# Patient Record
Sex: FEMALE | Race: WHITE
[De-identification: names, ages, dates, MRNs, and addresses within clinical notes are randomized per-mention and may not be internally consistent; named-entity substitution may affect disease eponyms.]

---

## 2020-12-30 ENCOUNTER — HOSPITAL ENCOUNTER (INPATIENT)
Dept: HOSPITAL 46 - DS | Age: 60
LOS: 2 days | Discharge: HOME | DRG: 743 | End: 2021-01-01
Attending: OBSTETRICS & GYNECOLOGY | Admitting: OBSTETRICS & GYNECOLOGY
Payer: COMMERCIAL

## 2020-12-30 VITALS — WEIGHT: 170.35 LBS | BODY MASS INDEX: 28.38 KG/M2 | HEIGHT: 65 IN

## 2020-12-30 DIAGNOSIS — K66.0: ICD-10-CM

## 2020-12-30 DIAGNOSIS — Z79.899: ICD-10-CM

## 2020-12-30 DIAGNOSIS — G89.18: ICD-10-CM

## 2020-12-30 DIAGNOSIS — K21.9: ICD-10-CM

## 2020-12-30 DIAGNOSIS — N83.8: Primary | ICD-10-CM

## 2020-12-30 DIAGNOSIS — Z90.710: ICD-10-CM

## 2020-12-30 DIAGNOSIS — Z53.31: ICD-10-CM

## 2020-12-30 PROCEDURE — 3E0T33Z INTRODUCTION OF ANTI-INFLAMMATORY INTO PERIPHERAL NERVES AND PLEXI, PERCUTANEOUS APPROACH: ICD-10-PCS | Performed by: NURSE ANESTHETIST, CERTIFIED REGISTERED

## 2020-12-30 PROCEDURE — 0DNW0ZZ RELEASE PERITONEUM, OPEN APPROACH: ICD-10-PCS | Performed by: OBSTETRICS & GYNECOLOGY

## 2020-12-30 PROCEDURE — 0UT70ZZ RESECTION OF BILATERAL FALLOPIAN TUBES, OPEN APPROACH: ICD-10-PCS | Performed by: OBSTETRICS & GYNECOLOGY

## 2020-12-30 PROCEDURE — 0UT20ZZ RESECTION OF BILATERAL OVARIES, OPEN APPROACH: ICD-10-PCS | Performed by: OBSTETRICS & GYNECOLOGY

## 2020-12-30 PROCEDURE — 3E0T3BZ INTRODUCTION OF ANESTHETIC AGENT INTO PERIPHERAL NERVES AND PLEXI, PERCUTANEOUS APPROACH: ICD-10-PCS | Performed by: NURSE ANESTHETIST, CERTIFIED REGISTERED

## 2020-12-30 PROCEDURE — 0UJ84ZZ INSPECTION OF FALLOPIAN TUBE, PERCUTANEOUS ENDOSCOPIC APPROACH: ICD-10-PCS | Performed by: OBSTETRICS & GYNECOLOGY

## 2020-12-30 NOTE — NUR
THIS RN IN PTS ROOM TO CHECK ON PT. PT STATES THAT SHE IS HAVING RIGHT UPPPER
QUADRANT PAIN THAT FEELS SHARP. PT STATES THAT THE PAIN GETS BETTER WHEN SHE
STRETCHES HER ARMS UP. THIS RN PROVIDED PT WITH GAS PAIN MEDS AND 1 PERCOCET.
THIS RN PROVIDED PT WITH MORE WATER AND SNACKS

## 2020-12-30 NOTE — NUR
PATIENT AMBULATED ONCE AROUND NURSE'S STATION LOOP. SBA WITH FWW. PATIENT WAS
STABLE ON HER FEET, BUT SLOW DUE TO PAIN IN BOTH HER ABDOIMINAL AREA AND HER
LEFT SHOLDER. PATIENT WAS ASSISTED BACK TO BED WITH BLANKETS FOR WARMTH, WARM
COMPRESSES, AND A FRESH ICE WATER WAS GIVEN. PATIENT ASKED IF SHE COULD HAVE
ANY MORE PAIN MEDICATION. MACKENZIE BUNDY NOTIFIED THAT PAIN MEDS WERE REQUESTED.
CALL LIGHT IN REACH. NO OTHER NEEDS AT THIS TIME.

## 2020-12-30 NOTE — NUR
12/30/20 0943 Jessica Diaz
0934- PT TO PACU IN SUPINE POSITION EYES CLOSED WITH ORAL AIRWAY IN
PLACE. PT DOES NOT RESPOND TO VERBAL STIMULI. BREATHING EASY AND
UNLABORED. SPO2 >95% ON 6 L O2 VIA SIMPLE MASK. REPORT RECEIVED FROM
CRNA.
 
0942- PT CONTINUES TO SLEEP IN SUPINE POSITION WITH ORAL AIRWAY IN
PLACE. BREATHING EASY AND UNLABORED. SPO2 >95% ON 6 L O2 VIA SIMPLE
MASK. VSS.

## 2020-12-30 NOTE — NUR
WHILE CHECKING ON PT, PT STATED THAT SHE WAS HAVING RIGHT UPPER QUADRANT PAIN.
PT STATES THAT THERE IS RARE PAIN IN HER LOWER ABDOMEN NEAR THE SURGICAL SITE.
THIS RN ASSISTED PT WITH A FRONT WHEEL WALKER TO DO THREE LAPS IN PTS ROOM. PT
TOLERATED WELL BUT THERE WAS ONLY A SLIGHT IMPROVEMENT IN GAS PAIN. THIS RN
WILL RETURN WITH MEDS FOR PT

## 2020-12-30 NOTE — NUR
PATIENT IS RESTING IN BED, ON ROOM % VITALS ARE WITHIN PARAMETERS.
PATIENT IS REPORTING 7/10 MID ABDOMINAL POST OP PAIN AND 800MG PO IBUPROFEN
GIVEN WITH 2MG OF IV MORPHINE.  LOWER MID-ABD INCISIONS IN INTACT WITH
OCCLUSIVE SURGICAL DRESSING AND SEVERAL SMALL SPOTS OF RED DRAINAGE.  PATIENT
IS OTHERWISE RESTING WELL.  IVF CHANGED TO IV PUMP AND IS LR @ 125.  PATIENT
DENIES OTHER NEEDS, PLAN TO ADVANCE DIET, CURRENTLY HAVING JELLO AND CRACKERS.

## 2020-12-30 NOTE — NUR
PT ASSESSMENT COMPLETED. PRN PAIN MEDS PROVIDED FOR 8/10 ABD PAIN.SCHEDULED
MEDS PROVIDED. MIDLINE INCISION HAS SEVERAL DRY AREAS OF BLOOD, WNL. PT STATES
ABD MODERATELY DISTENDED. BOWEL TONES ACTIVE. MARR WNL. IV WNL, CDI, FLUSHED
WELL. PT UP TO CHAIR. MARR EMPTIED. NO OTHER NEEDS AT THIS TIME. CALL LIGHT
IN REACH.

## 2020-12-30 NOTE — NUR
PT CALLED D/T BEEPING IV PUMP, NEW BAG OF LR IS NOW INFUSING. PT DENIES
FURTHER NEEDS. CALL LIGHT IS CLOSE.

## 2020-12-30 NOTE — NUR
Spoke with Allyson.  She resides in [facility] with her spouse.  They are [employer]
growers.  Spouse is in down season and will stay home with her.  She states
she is very active. No DME.  Plans on dc to home with her spouse.

## 2020-12-30 NOTE — NUR
THIS RN IN PTS ROOM TO REASSESS PTS PAIN. PT STATES THAT HER PAIN IS MUCH MORE
IMPROVED, PT STATES THAT SHE WAS ACUTALLY ABLE TO RELAX ENOUGH TO GET SOME
SLEEP. THIS RN PROVIDED PT WITH ANOTHER HEAT PAD AND ANOTHER PILLOW FOR
COMFORT.

## 2020-12-30 NOTE — NUR
PT IS BACK TO DS FROM PACU. SHE IS EMOTIONAL UPON HER RETURN.  IS AT
THE BEDSIDE. CALL LIGHT WITHIN REACH. WATER ON BEDSIDE TABLE. NO ADDITIONAL
NEEDS AT THIS TIME.

## 2020-12-31 NOTE — NUR
Rounding. Pt up to bathroom, New fluids hung. Pt back to chair, positioned to
comfort. Table and call light within reach.

## 2020-12-31 NOTE — NUR
ASSESSMENT COMPLETE, SCHEDULED MOTRIN GIVEN (SEE EMAR). MIDLINE INCISION
INTACT, FOAM DRESSING IN PLACE. MILD TO MODERATE SEROSANGUINEOUS SHADOWING
NOTED, pt REPORTS TOLERABLE PAIN. DENIES NAUSEA. VSS, CALL LIGHT IN REACH.

## 2020-12-31 NOTE — NUR
Med pass and assessment completed, VSS, see charting. Pt able to take all meds
without difficulty. Pt in bed, finishing breakfast, denies nausea and other
needs at this time. Pt has call light and table within reach.

## 2020-12-31 NOTE — NUR
PT CALLED AFTER USING THE RESTROOM. WALKED 2 LAPS AROUND THE FERNANDEZ WITH HER SBA
WITH FWW. ADMINISTERED 2 PERCOCET FOR 7/10 PAIN. PT STATES SHE FEELS THE GAS
MOVING AROUND AND SOME BURNING SENSATION. ADMINISTERED HER SCHEDULED HEPARIN
AS WELL. SHE IS TUCKED INTO BED WITH FRESH WATER AND VS & 1&O'S ENTERED. SCD'S
ARE IN PLACE AND CALL LIGHT IS CLOSE. PT DENIES FURTHER NEEDS.

## 2020-12-31 NOTE — NUR
pt used call light to ask for assistance into restroom, SBA, voided 1000ccs of
clear, yellow urine. pt states she is having gas bubble pain, no further needs
at this time.

## 2020-12-31 NOTE — NUR
ASSESSMENT COMPLETED. PT ABD PAIN 7/10, PRN PAIN MEDS PROVIDED. MIDLINE
INCISION HAS SCANT NEW BLOOD ON BANDAGE. MODERATE ABD DISTENTION, TENDER,
BOWEL TONES ACTIVE. PT STATES SHE IS PASSING FLATUS. CMS INTACT. LUNGS CLEAR,
HEART TONES REGULAR. THIERRY MATHEWS IN ROOM TO TAKE VS AND I&O.

## 2020-12-31 NOTE — NUR
Rounding. Pt reports tolerable pain and no nausea. Pt getting up to chair. Pt
denies needs at this time. Table and call light within reach.

## 2020-12-31 NOTE — NUR
Spoke with patient, she is walking in the [place]ann.  States she is feeling great.
No change in plan for dc.

## 2020-12-31 NOTE — NUR
This RN to room to assist CNA in setting up heating pack device for pt. Pt
was just up to ambulate and was able to walk once around the nurses stations.
Pt up to chair, positioned to comfort. Pt given blanket, heating pad, fresh
water, and hot tea as ordered. Pt denies further needs at this time. Pt in
chair, table and call light within reach.

## 2020-12-31 NOTE — NUR
Pt given PRN pain med for 5/10 pain in her abdomen. Pts midline dressing is
still intact, moderate amount of darker red drainage along the dressing, not
saturated through. Pt denies nausea at this time. Pt up in room, per request,
table and call light within reach.

## 2020-12-31 NOTE — NUR
PT RESTING IN BED, EYES CLOSED. RR EVEN, UNLABORED. IV FLUIDS INFUSING PER
ORDER. CALL LIGHT IN REACH.

## 2020-12-31 NOTE — NUR
SCHEDULED MED PROVIDED. VS AND I&O COMPLETED. TEJINDER REMOVED WNL PER ORDER. NO
OTHER NEEDS AT THIS TIME. CALL LIGHT IN REACH.

## 2020-12-31 NOTE — NUR
SHIFT REPORT RECEIVED FROM JOEYHICLINTON BAILEY AT BEDSIDE. pt AWAKE AND RESTING
IN BED,  IN ROOM. MIDLINE DRESSING INTACT, MILD SEROSANGUINEOUS
SHADOWING NOTED, pt DENIES NAUSEA OR NEEDS. CALL LIGHT IN REACH, BOARD
UPDATED.

## 2020-12-31 NOTE — NUR
Shift report from Susan MANN included:
Pt had an uneventful evening. Post surgery, midline dressing removed and
incision site is now open to air. Pts barriga was also removed early this
morning and pt is due to void. Pt is currently in bed, breathing even and
unlabored, table and call light within reach.

## 2021-01-01 NOTE — NUR
PT UP IN ROOM, % OF BREAKFAST, NO NAUSEA, REPORTS GOOD PAIN CONTROL,
OCCASIONAL ABD CRAMPING BUT IS NOW PASSING GAS. HOPES TO GO HOME THIS MORNING,
IN [employer] SPIRITS, DENIES FURTHER NEEDS.

## 2021-01-01 NOTE — NUR
rounding on pt, pt reports increasing 4/10 pain, prn pain medication given
(see emar). no changes to abd dressing, pt denies nausea. up sba to void and
back in bed. fresh water at bedsdie. no further needs, call light in reach.

## 2021-01-01 NOTE — NUR
IN TO ASST PT TO THE TOILET, 1PA/SBA, TOLERATES WELL WITH SOME SORENESS, NO
FURTHER NEEDS AT THIS TIME, PT IS BACK IN BED

## 2021-01-01 NOTE — NUR
DISCHARGE INSTRUCTIONS REVIEWED WITH PATIENT, VERBALIZES UNDERSTANDING OF
MEDICATIONS, SX TO REPORT AND FOLLOWUP APPOINTMENT, PHARMACY WAS ALSO IN AND
SPOKE WITH PT. SHE IS IN [employer]ITS, EXCITED TO GO HOME. CALLED  FOR
RIDE HOME, SCHEDULED IBUPROFEN GIVEN AND OXYCODONE FOR 6/10 PAIN. DENIES
NAUSEA. TAKING FLUIDS WELL.

## 2021-01-05 NOTE — PATH
[facility]
                                    [address], [place]  [address]
_________________________________________________________________________________________
                                                                 Signed   
 
 
 
SPECIMEN(S): A RIGHT OVARY AND TUBE
SPECIMEN(S): B LEFT OVARY AND TUBE
 
SPECIMEN SOURCE:
A. RIGHT OVARY AND TUBE
B. LEFT OVARY AND TUBE
 
CLINICAL HISTORY:
Laparoscopy with removal right ovarian mass and left ovary.
 
FINAL PATHOLOGIC DIAGNOSIS:
A.  Ovary and fallopian tube, right, salpingo-oophorectomy:
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
-  Fallopian tube with paratubal cyst.
B.  Ovary and fallopian tube, left, salpingo-oophorectomy:
-  Ovary with endosalpingiosis and surface fibrous adhesions.
-  Fallopian tube with no histopathologic abnormality.
 
COMMENT:
As part of [facility]' Quality Improvement Program, selected slides of 
this case were reviewed by another member of our pathology staff. 
NAL:NRT:cml:C2NR
 
MICROSCOPIC EXAMINATION:
Histologic sections of all submitted blocks are examined by light microscopy.  
These findings, together with the gross examination, support the pathologic 
diagnosis. 
 
GROSS DESCRIPTION:
Two specimens are received in two containers, labeled "LV."
A.  The specimen, labeled "LV, right ovary and tube," is received in formalin 
and consists of ovary with attached fallopian tubes.  The ovary weighs 2350 
grams.  The serosal surface is pink-tan, 
smooth and fluctuating.  Specimen measures 23 x 17.5 x 17.0 cm.  Sectioning 
through the specimen reveals a cyst that measures 22 cm in greatest dimension.  
The cyst is filled with a clear fluid.  The 
inner surface of the cyst is smooth.  No papillary excrescences are grossly 
identified.  Within the larger cyst is a smaller clear fluid-filled cyst, 4.2 
cm in greatest dimension.  The inner surface 
of the smaller cyst is smooth.  The fallopian tube shows fimbria and violaceous 
and smooth serosa.  It measures 9.5 cm in length and 0.6 cm in diameter.  
 
                                                                                    
_________________________________________________________________________________________
PATIENT NAME:     KIRK WALTON                 
MEDICAL RECORD #: [id number]            PATHOLOGY                     
          ACCT #: [id number]       ACCESSION #: [id number]     
DATE OF BIRTH:   [birth year]            REPORT #: [id number]       
PHYSICIAN:        ALVARADO PATHOLOGY              
PCP:              KIRSTIN VALDES MD      
REPORT IS CONFIDENTIAL AND NOT TO BE RELEASED WITHOUT AUTHORIZATION
 
 
                                  [facility]
                                    [address], [place]  [address]
_________________________________________________________________________________________
                                                                 Signed   
 
 
Sectioning through the tube is unremarkable. 
Representative sections are submitted as follows:
(A1) Larger cyst wall
(A2) Smaller cyst wall, fallopian tube
 
Additional sections of ovarian cyst and fallopian tube fimbria are submitted in 
three additional cassettes (A3-A5) per Dr. Fraga request. 
FAY (under the direct supervision of a pathologist)
B.  The specimen, labeled "LV, left ovary and fallopian tube," is received in 
formalin and consists of ovary with attached fallopian tube.  The ovary 
measures 2.2 x 1.5 x 0.7 cm.  The serosal surface 
is yellow-tan, smooth.  Sectioning through the ovary reveals yellow-tan, 
homogenous tissue.  Fallopian tube shows fimbria and violaceous and smooth 
serosa.  It measures 3.5 x 0.7 cm.  Representative 
sections are submitted in cassettes (B1-B2).
FAY (under the direct supervision of a pathologist)
The Gross Description was prepared using a voice recognition system. The report 
was reviewed for accuracy; however, sound-alike word errors, addition and/or 
deletions may occur. If there is any 
question about this report, please contact Client Services.
 
PERFORMING LABORATORY:
The technical component was performed by [facility], [address] [place] [address] (Medical Director: Anju Morales MD; CLIA# [id number]). 
Professional interpretation was performed by 
[facility], [facility], [address] [place] [address] (CLIA# [id number]). 
 
Diagnostician:  Sahra Fraga MD
Pathologist
Electronically Signed 01/05/2021
 
 
Copies:                                
~
 
 
 
 
 
 
 
 
                                                                                    
_________________________________________________________________________________________
PATIENT NAME:     KIRK WALTON                 
MEDICAL RECORD #: [id number]            PATHOLOGY                     
          ACCT #: [id number]       ACCESSION #: [id number]     
DATE OF BIRTH:   [birth year]            REPORT #: [id number]       
PHYSICIAN:        ALVARADO VILLA              
PCP:              KIRSTIN VALDES MD      
REPORT IS CONFIDENTIAL AND NOT TO BE RELEASED WITHOUT AUTHORIZATION

## 2021-01-05 NOTE — PATH
[facility]
                                    [address] [id number]
_________________________________________________________________________________________
                                                                 Signed   
 
 
 
ORDERING PHYSICIAN:
Elke Mark MD
PATIENT NAME:
KIRK WALTON
GENDER:  F     :  1960
 
SPECIMEN(S): A ABDOMINAL WASHINGS
 
GROSS DESCRIPTION:  70 ML OF CLEAR, COLORLESS FLUID IN CYTOLYT
 
CLINICAL HISTORY:  NO CLINICAL DATA PROVIDED
 
LABORATORY PREPARATIONS:  1 MONOLAYER
 
CYTOLOGIC INTERPRETATION:
Pelvic washing:
Negative for malignant cells.
 
DESCRIPTION:
The preparations contain mesothelial cells and rare inflammatory cells.  
Atypical cytologic findings are not encountered. 
 
SPECIMEN ADEQUACY:
Satisfactory for Evaluation
 
PERFORMING LABORATORY:
Technical preparation was performed by [facility], [address] [place] [address] (Medical Director:  Cedric Grove D.O.; CLIA#:  
[id number]). 
Professional interpretation was performed by [facility], [address] [address] (Medical 
Director: Sahra Fraga MD; CLIA# [id number]). 
 
Diagnostician:  Mabel [facility]TER [facility]
Cytotechnologist
Diagnostician:  Sahra Fraga MD
Pathologist
Electronically Signed 2021
 
 
Copies:                                
 
                                                                                    
_________________________________________________________________________________________
PATIENT NAME:     KIRK WALTON                 
MEDICAL RECORD #: [id number]            PATHOLOGY                     
          ACCT #: [id number]       ACCESSION #: [id number]     
DATE OF BIRTH:   [birth year]            REPORT #: [id number]       
PHYSICIAN:        ALVARADO PATHOLOGY              
PCP:              KIRSTIN VALDES MD      
REPORT IS CONFIDENTIAL AND NOT TO BE RELEASED WITHOUT AUTHORIZATION
 
 
                                  [address] [place]  [address]
_________________________________________________________________________________________
                                                                 Signed   
 
 
~
 
 
 
 
 
 
 
 
 
 
 
 
 
 
 
 
 
 
 
 
 
 
 
 
 
 
 
 
 
 
 
 
 
 
 
 
 
 
 
 
 
 
                                                                                    
_________________________________________________________________________________________
PATIENT NAME:     KIRK WALTON                 
MEDICAL RECORD #: [id number]            PATHOLOGY                     
          ACCT #: [id number]       ACCESSION #: [id number]     
DATE OF BIRTH:   [birth year]            REPORT #: [id number]       
PHYSICIAN:        ALVARADO PATHOLOGY              
PCP:              KIRSTIN VALDES MD      
REPORT IS CONFIDENTIAL AND NOT TO BE RELEASED WITHOUT AUTHORIZATION

## 2021-01-05 NOTE — OR
[facility]
                                    [address], [place]  [address]
_________________________________________________________________________________________
                                                                 Signed   
 
 
DATE OF OPERATION:
2020
 
SURGEON:
Pura Mark MD
 
FIRST ASSISTANT:
Neyda Pineda DO
 
PREOPERATIVE DIAGNOSIS:
Right adnexal mass.
 
POSTOPERATIVE DIAGNOSIS:
Right adnexal mass with pelvic and abdominal adhesions.
 
PROCEDURE:
Laparoscopy, laparotomy, bilateral salpingo-oophorectomy, lysis of adhesions.
 
ANESTHESIA:
General ET.
 
ESTIMATED BLOOD LOSS:
50 mL.
 
DRAINS:
Castillo catheter.
 
INDICATIONS AND FINDINGS:
The patient is a 60-year-old female  3, para 3, status post TVH many years ago
for vaginal prolapse, who presented after a long absence of Gyn care of approximately 7
years and was found to have a large right adnexal mass.  It appeared to be fairly simple
on ultrasound and CT with a few fine septations.  There was no evidence of any ascites
or omental caking on her CT as well.  Her CA-125 was normal.  It was felt that possibly
this could be addressed laparoscopically.  At the time of surgery, exam under anesthesia
revealed the ovary to be enlarged and actually above the umbilicus on the right side
approximately 3 cm above.  It was fairly mobile.  On laparoscopy, there were adhesions
of the omentum superiorly to the ovary, which were fairly fine as well as thick 
adhesions to the vaginal cuff on the right side.  It was extremely large.  The left 
The left ovary was tiny, approximately 1.5 cm in size.  There were adhesions of the 
sigmoid to the vaginal cuff as well.  There was no ascites present.  There was no
evidence of any other seeding.  The surface of the right ovary was completely smooth.
It did not rupture during surgery and was delivered intact.
 
    Electronically Signed By: PURA MARK MD  21 0750
_________________________________________________________________________________________
PATIENT NAME:     KIRK WALTON                 
MEDICAL RECORD #: [id number]            OPERATIVE REPORT              
          ACCT #: [id number]  
DATE OF BIRTH:   [birth year]            REPORT #: [id number]      
PHYSICIAN:        PURA MARK MD            
PCP:              KIRSTIN VALDES MD      
REPORT IS CONFIDENTIAL AND NOT TO BE RELEASED WITHOUT AUTHORIZATION
 
 
                                  [facility]
                                    [address], [place]  [address]
_________________________________________________________________________________________
                                                                 Signed   
 
 
 
DESCRIPTION OF PROCEDURE:
The patient was prepped and draped in the dorsal lithotomy position.  A Castillo catheter
was placed and a sponge stick placed in the vagina.  Attention was directed above.  The
infraumbilical area was injected with 0.5% Marcaine plain.  An incision was made with a
knife and each layer serially elevated and incised until the fascia was opened and
identified and stay sutures were placed.  The peritoneum was opened bluntly.  The scope
was then placed after placement of Tito.  The abdomen was then insufflated with carbon
dioxide gas.  The abdomen was appropriately distended.  The pelvis and abdomen were
evaluated and the extensive adhesions noted.  It was felt that given the size and the
amount of adhesions that an open procedure would be a better option with less chance of
any rupture.  Washings were obtained via the Tito.  Following this, the Tito was
removed and a midline incision was made with a knife.  It was carried down through the
fascia.  The incision was extended superiorly and inferiorly.  Muscles were already
 and the peritoneum was opened and the incision extended superiorly and
inferiorly.  Following this, the adhesions superiorly were lysed.  The Kolby retractor
was then placed and the patient's right infundibulopelvic ligament was identified and
grasped with curved Z clamps x2.  It was then divided.  This was followed by free tie of
0 Vicryl followed by suture ligature of 0 Vicryl on the infundibulopelvic ligament.  The
ovarian side was suture ligated as well.  The peritoneal adhesions were then taken on
the lateral aspect down to the vaginal cuff.  The adhesions were sharply lysed as well
at the cuff.  Following this, the ovary could be removed intact.  The patient's right
infundibulopelvic ligament was re-evaluated, there was some oozing around this.  This
area was regrasped with a Tonsil and right angle and free tied with another 0 Vicryl
suture.  The ureter was evaluated and found to be below and not compromised by the
suture.  Attention was then directed to the patient's left side.  The adhesions of the
sigmoid to the cuff and the pelvic sidewall were lysed sharply using the Metzenbaum
scissors.  The ovary itself was then seen.  It was very small with a normal tube as
well.  The infundibulopelvic ligament was isolated, identified, and a window placed in
the peritoneum.  The infundibulopelvic ligament was then grasped x2 with curved Z clamps
and divided.  This was followed by free tie of 0 Vicryl followed by suture ligature of 0
Vicryl.  Following this, the ovary itself could be peeled off the pelvic sidewall with
sharp dissection.  Following this, the abdomen was copiously irrigated and inspected and
bleeding points on the raw areas were controlled with cautery.  There was some bleeding
in the space of Retzius, which responded to figure-of-eight sutures of 2-0 chromic.
There was quite a bit of raw area present just because of the extensive adhesions and
Tisseel was sprayed over the vaginal cuff and at the sidewalls to aid in hemostasis.
Following this, preparations were made for closure.  The retractor was removed as were
the lap tapes.  The peritoneum was identified and closed with a running suture of 3-0
Vicryl.  The muscles were not reapproximated.  The fascia was closed from each
end to the midline with a running suture of 0 PDS.  The subcu tissue was irrigated,
inspected and bleeding points were controlled with cautery.  The skin edges were brought
 
    Electronically Signed By: PURA MARK MD  21 0750
_________________________________________________________________________________________
PATIENT NAME:     KIRK WALTON                 
MEDICAL RECORD #: [id number]            OPERATIVE REPORT              
          ACCT #: [id number]  
DATE OF BIRTH:   [birth year]            REPORT #: [id number]      
PHYSICIAN:        PURA MARK MD            
PCP:              KIRSTIN VALDES MD      
REPORT IS CONFIDENTIAL AND NOT TO BE RELEASED WITHOUT AUTHORIZATION
 
 
                                  [facility]
                                    [address] [place] [address]  [address]
_________________________________________________________________________________________
                                                                 Signed   
 
 
together with interrupted sutures of 3-0 Vicryl.  The skin was closed with staples.
Following this, the vaginal sponge stick was removed.  All sponge and needle counts were
correct though a follow up abdominal/pelvic X Ray was done as there was a miscount on
the needle drivers.  There was no evidence of any retained instruments.  She tolerated
the procedure well and was taken to the recovery room in good condition.
 
 
 
 
            ________________________________________
            Pura Mark MD 
 
 
PJW/SHARLENE
Job #:  [id number]
DD:  2020 09:41:05
DT:  2020 10:19:20
 
cc:            Dr. Calixto Pineda DO
 
 
Copies:  NEYDA PINEDA DO
~
 
 
 
 
 
 
 
 
 
 
 
 
 
 
 
 
 
 
    Electronically Signed By: PURA MARK MD  21 0750
_________________________________________________________________________________________
PATIENT NAME:     KIRK WALTON                 
MEDICAL RECORD #: [id number]            OPERATIVE REPORT              
          ACCT #: [id number]  
DATE OF BIRTH:   [birth year]            REPORT #: [id number]      
PHYSICIAN:        PURA MARK MD            
PCP:              KIRSTIN VALDES MD      
REPORT IS CONFIDENTIAL AND NOT TO BE RELEASED WITHOUT AUTHORIZATION No

## 2022-10-12 ENCOUNTER — HOSPITAL ENCOUNTER (OUTPATIENT)
Dept: HOSPITAL 46 - DS | Age: 62
Discharge: HOME | End: 2022-10-12
Attending: SURGERY
Payer: COMMERCIAL

## 2022-10-12 VITALS — HEIGHT: 65 IN | BODY MASS INDEX: 29.22 KG/M2 | WEIGHT: 175.36 LBS

## 2022-10-12 DIAGNOSIS — K43.2: Primary | ICD-10-CM

## 2022-10-12 DIAGNOSIS — K21.9: ICD-10-CM

## 2022-10-12 DIAGNOSIS — I10: ICD-10-CM

## 2022-10-12 PROCEDURE — 0WUF0JZ SUPPLEMENT ABDOMINAL WALL WITH SYNTHETIC SUBSTITUTE, OPEN APPROACH: ICD-10-PCS | Performed by: SURGERY

## 2022-10-12 PROCEDURE — C1781 MESH (IMPLANTABLE): HCPCS

## 2022-10-12 NOTE — NUR
STEADY ON FEET WITH ONE PERSON STAND BY ASSIST FOR AMBULATION TO BR FOR
UNMEASURED VOID. DENIES NAUSEA AND REPORTS 2/10 ABDOMINAL PAIN. RETURNED TO
ROOM WHERE  ASSISTS PATIENT WITH DRESSING.
REVIEWED DISCHARGE INSTRUCTIONS WITH PATIENT AND SPOUSE AT BEDSIDE AND TEACH
BACK USED.

## 2022-10-12 NOTE — NUR
PT USES CALL LIGHT FOR MORE WATER. ICED WATER REFILLED AND PUDDING PROVIDED.
WARM BLANKETS WITH LI HUGGER ON WARM. SPOUSE IN ROOM AT THIS TIME. CALL
LIGHT WITHIN REACH.

## 2022-10-12 NOTE — NUR
10/12/22 1223 Toya Harman
1214-PATIENT ARRIVED TO PACU ON 6L MASK RR EVEN AWAKE REPORTS PAIN
3/10 TO ABDOMEN DRESSING INTACT ICE APPLIED. SR. IVF INFUSING.
WASHCLOTH PROVIDED TO PATIENT AS LIGHTS ARE BRIGHT.
 
1223-PATIENT REPORTS PAIN 4/10 WILL MEDICATE PER EMAR. PLACED ON RA RR
EVEN 99%

## 2022-10-13 NOTE — OR
[facility]
                                    [address], [place]  [address]
_________________________________________________________________________________________
                                                                 Signed   
 
 
DATE OF OPERATION:
10/12/2022
 
SURGEON:
Ivette Anderson MD
 
PREOPERATIVE DIAGNOSIS:
Periumbilical incisional hernia (4 cm).
 
POSTOPERATIVE DIAGNOSIS:
Periumbilical incisional hernia (4 cm).
 
PROCEDURE:
Primary periumbilical incisional herniorrhaphy with intraabdominal Ventralex mesh (8 cm).
 
ESTIMATED BLOOD LOSS:
None.
 
INDICATIONS:
Kirk is a 62-year-old female, who I have known for some time.  I have helped to take
care of her .  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  She previously underwent a lower
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  She noticed bulging at
the umbilicus and off to the right side.  She said it has been increasing in size.  She
learned the purchasing abdominal binder, which has been helping.  She had been to her
primary care provider.  She had been referred to my office.  A CT scan of abdomen and
pelvis confirmed a 4 cm fascial defect.  The hernia contained small bowel.  In the
office, we could partially reduce the hernia on the right side, but not the left.  She
wanted to wait until the summer was over and they were done with their farming.  She
returns now to have her surgery.  There were no new issues since I had seen her last.
Her  actually underwent an almost identical surgery with myself.  I gave them the
another booklet on hernias.  We looked at that carefully together.  She understands the
nature of her incisional hernia.  We had reviewed the difference between a primary
suture repair and a mesh repair.  She understands the expected intraop and postop
course.  There is risk including, but not limited to bleeding, infection, scarring,
change in contour of the skin, damage to bowel, infection of mesh requiring removal,
recurrent hernias, and chronic pain.  She had expressed understanding and wished to
proceed. 
 
DESCRIPTION OF PROCEDURE:
I met with Kirk and her  in the preop area.  We all agreed on her periumbilical
hernia.  We marked that appropriately.  After this, she was taken into the operating
 
    Electronically Signed By: IVETTE ANDERSON MD  10/13/22 0649
_________________________________________________________________________________________
PATIENT NAME:     KIRK WALTON                 
MEDICAL RECORD #: [id number]            OPERATIVE REPORT              
          ACCT #: [id number]  
DATE OF BIRTH:   [birth year]            REPORT #: [id number]      
PHYSICIAN:        IVETTE ANDERSON MD             
PCP:              KIRSTIN DE LUNA MD      
REPORT IS CONFIDENTIAL AND NOT TO BE RELEASED WITHOUT AUTHORIZATION
 
 
                                  [facility]
                                    [address] [place] [address]  [id number]
_________________________________________________________________________________________
                                                                 Signed   
 
 
room and placed in the supine position under general endotracheal tube anesthesia.  She
was given preoperative antibiotics along with subcutaneous heparin.  SCDs were utilized.
 Castillo catheter was inserted with return of clear yellow urine without difficulty.  She
was then prepped and draped in the usual sterile fashion.  We simply extended her
midline incision up and above the umbilicus.  We carried this down and around the hernia
bluntly and with the cautery.  We opened the hernia sac and found that the bowel was
reduced at this point.  We divided the hernia sac from the fascial edges with the
cautery.  The fascial defect was indeed about 4 cm mostly transversely.  We therefore
chose an 8 cm round Ventralex mesh, and we placed that inside the abdomen, brought up,
flushed against the posterior abdominal wall.  We closed the fascial defect transversely
with interrupted #1 Prolene figure-of-eight sutures.  Several passes of the suture went
through the mesh to hold it in place.  The tab on the mesh was cut, flushed with the
abdominal wall.  We then injected local anesthetic in the abdominal wall and
subcutaneous tissues.  The wound was irrigated and suctioned out until clear.  We
brought the umbilicus back down the midline with interrupted 2-0 PDS suture.  We closed
some of the dead space with interrupted 3-0 Monocryl sutures.  We then brought the
dermis back together very carefully with interrupted zero subcuticular Monocryl sutures.
 The skin edges reapproximated with a running 5-0 fast absorbing plain gut suture.  Dry
gauze and tape were then applied.  Her Castillo catheter had been removed.  She was
awakened from anesthesia, extubated in the OR, and taken to the recovery room in stable
condition. 
 
 
 
            ________________________________________
            Ivette Anderson MD 
 
 
ALB/MODL
Job #:  [id number]
DD:  10/12/2022 12:22:37
DT:  10/12/2022 19:07:12
 
cc:            Ivette Anderson MD 
 
               Patient Chart 
 
               Kirstin De Luna MD
 
 
Copies:  IVETTE ANDERSON MD
 
 
    Electronically Signed By: IVETTE ANDERSON MD  10/13/22 0649
_________________________________________________________________________________________
PATIENT NAME:     KIRK WALTON                 
MEDICAL RECORD #: [id number]            OPERATIVE REPORT              
          ACCT #: [id number]  
DATE OF BIRTH:   [birth year]            REPORT #: [id number]      
PHYSICIAN:        IVETTE ANDERSON MD             
PCP:              KIRSTIN DE LUNA MD      
REPORT IS CONFIDENTIAL AND NOT TO BE RELEASED WITHOUT AUTHORIZATION
 
 
                                  [facility]
                                    [address] [place], [place]  [address]
_________________________________________________________________________________________
                                                                 Signed   
 
 
         KIRSTIN DE LUNA MD
~
 
 
 
 
 
 
 
 
 
 
 
 
 
 
 
 
 
 
 
 
 
 
 
 
 
 
 
 
 
 
 
 
 
 
 
 
 
 
 
 
 
    Electronically Signed By: IVETTE ANDERSON MD  10/13/22 0649
_________________________________________________________________________________________
PATIENT NAME:     KIRK WALTON                 
MEDICAL RECORD #: [id number]            OPERATIVE REPORT              
          ACCT #: [id number]  
DATE OF BIRTH:   [birth year]            REPORT #: [id number]      
PHYSICIAN:        IVETTE ANDERSON MD             
PCP:              KIRSTIN DE LUNA MD      
REPORT IS CONFIDENTIAL AND NOT TO BE RELEASED WITHOUT AUTHORIZATION